# Patient Record
Sex: MALE | Race: BLACK OR AFRICAN AMERICAN | NOT HISPANIC OR LATINO | Employment: UNEMPLOYED | URBAN - METROPOLITAN AREA
[De-identification: names, ages, dates, MRNs, and addresses within clinical notes are randomized per-mention and may not be internally consistent; named-entity substitution may affect disease eponyms.]

---

## 2023-08-22 ENCOUNTER — OFFICE VISIT (OUTPATIENT)
Dept: BEHAVIORAL HEALTH | Facility: CLINIC | Age: 28
End: 2023-08-22

## 2023-08-22 ENCOUNTER — TELEPHONE (OUTPATIENT)
Dept: BEHAVIORAL HEALTH | Facility: CLINIC | Age: 28
End: 2023-08-22

## 2023-08-22 VITALS — SYSTOLIC BLOOD PRESSURE: 119 MMHG | HEART RATE: 67 BPM | DIASTOLIC BLOOD PRESSURE: 78 MMHG

## 2023-08-22 DIAGNOSIS — F11.93 OPIOID WITHDRAWAL (H): ICD-10-CM

## 2023-08-22 DIAGNOSIS — F11.90 OPIOID USE DISORDER: Primary | ICD-10-CM

## 2023-08-22 DIAGNOSIS — F14.90 COCAINE USE: ICD-10-CM

## 2023-08-22 LAB
AMPHETAMINE QUAL URINE POCT: NEGATIVE
BARBITURATE QUAL URINE POCT: NEGATIVE
BENZODIAZEPINE QUAL URINE POCT: NEGATIVE
BUPRENORPHINE QUAL URINE POCT: NEGATIVE
COCAINE QUAL URINE POCT: ABNORMAL
CREATININE QUAL URINE POCT: ABNORMAL
FENTANYL UR QL: ABNORMAL
INTERNAL QC QUAL URINE POCT: ABNORMAL
MDMA QUAL URINE POCT: NEGATIVE
METHADONE QUAL URINE POCT: NEGATIVE
METHAMPHETAMINE QUAL URINE POCT: ABNORMAL
OPIATE QUAL URINE POCT: NEGATIVE
OXYCODONE QUAL URINE POCT: NEGATIVE
PH QUAL URINE POCT: ABNORMAL
PHENCYCLIDINE QUAL URINE POCT: NEGATIVE
POCT KIT EXPIRATION DATE: ABNORMAL
POCT KIT LOT NUMBER: ABNORMAL
SPECIFIC GRAVITY POCT: 1.02
TEMPERATURE URINE POCT: ABNORMAL
THC QUAL URINE POCT: NEGATIVE

## 2023-08-22 PROCEDURE — 99204 OFFICE O/P NEW MOD 45 MIN: CPT | Performed by: NURSE PRACTITIONER

## 2023-08-22 PROCEDURE — 99000 SPECIMEN HANDLING OFFICE-LAB: CPT | Performed by: NURSE PRACTITIONER

## 2023-08-22 PROCEDURE — G0480 DRUG TEST DEF 1-7 CLASSES: HCPCS | Performed by: NURSE PRACTITIONER

## 2023-08-22 RX ORDER — BUPRENORPHINE AND NALOXONE 8; 2 MG/1; MG/1
1 FILM, SOLUBLE BUCCAL; SUBLINGUAL 2 TIMES DAILY
Qty: 14 FILM | Refills: 0 | Status: SHIPPED | OUTPATIENT
Start: 2023-08-22 | End: 2023-08-29

## 2023-08-22 RX ORDER — ONDANSETRON 4 MG/1
4 TABLET, ORALLY DISINTEGRATING ORAL EVERY 8 HOURS PRN
Qty: 12 TABLET | Refills: 0 | Status: SHIPPED | OUTPATIENT
Start: 2023-08-22

## 2023-08-22 RX ORDER — GABAPENTIN 300 MG/1
300 CAPSULE ORAL 3 TIMES DAILY PRN
Qty: 21 CAPSULE | Refills: 0 | Status: SHIPPED | OUTPATIENT
Start: 2023-08-22 | End: 2023-08-29

## 2023-08-22 RX ORDER — CLONIDINE HYDROCHLORIDE 0.1 MG/1
0.1 TABLET ORAL 3 TIMES DAILY PRN
Qty: 21 TABLET | Refills: 0 | Status: SHIPPED | OUTPATIENT
Start: 2023-08-22 | End: 2023-08-29

## 2023-08-22 ASSESSMENT — PATIENT HEALTH QUESTIONNAIRE - PHQ9: SUM OF ALL RESPONSES TO PHQ QUESTIONS 1-9: 1

## 2023-08-22 NOTE — PATIENT INSTRUCTIONS
"When it has been AT LEAST 24 hours from your last use of other opioids:     Day 1: Take 2 mg of Suboxone SL film under your tongue, then wait 30 - 45 minutes.    If withdrawal symptoms are not worse, and cravings persist or symptoms are not relieved continue to take 2- 4 mg every 2 hours. Take up to 16 mg on Day 1 .      If withdrawal symptoms do increase after this \"test dose,\" (precipitated withdrawal) don't take any more buprenorphine at that time, and use other medications for withdrawal symptoms.  Try start buprenorphine again the next day.      Day 2: take one 8-2 mg film every morning and another 8- 2 mg film every evening. Continue this until your next appointment.        "

## 2023-08-22 NOTE — PROGRESS NOTES
[facility] Initial Visit    ASSESSMENT/PLAN                                                      1. Opioid use disorder  - pt reporting 1 yr h/o daily fentanyl pill use via inhalation, last use 8/21/23 at 11 PM. Reviewed criteria met for OUD and MOUD options. Plan to start Suboxone at this time. Reviewed induction instructions including importance of waiting at least 24 hrs from last use prior to starting the medication.  All questions answered.   - Declines updated ID screening today   - Same day Comprehensive Assessment in  on 8/22/23. Pt is motivated to start inpatient programming.   - Drugs of Abuse Screen Urine (POC CUPS) POCT; Standing  - Fentanyl Qualitative with Reflex to Quant Urine; Future  - Drugs of Abuse Screen Urine (POC CUPS) POCT  - Fentanyl Qualitative with Reflex to Quant Urine  - naloxone (NARCAN) 4 MG/0.1ML nasal spray; Spray 1 spray (4 mg) into one nostril alternating nostrils as needed for opioid reversal every 2-3 minutes until assistance arrives  Dispense: 0.2 mL; Refill: 11  - buprenorphine HCl-naloxone HCl (SUBOXONE) 8-2 MG per film; Place 1 Film under the tongue 2 times daily for 7 days After following home start instructions  Dispense: 14 Film; Refill: 0    2. Opioid withdrawal (H)  - Reviewed use of below medications to treat withdrawal symptoms while starting suboxone. Declines medication for sleep.   - cloNIDine (CATAPRES) 0.1 MG tablet; Take 1 tablet (0.1 mg) by mouth 3 times daily as needed (opioid withdrawal, anxiety, restlessness, irritability, hot/cold flashes)  Dispense: 21 tablet; Refill: 0  - gabapentin (NEURONTIN) 300 MG capsule; Take 1 capsule (300 mg) by mouth 3 times daily as needed for other (withdrawal symptoms including anxiety, restlessness, sleeplessness)  Dispense: 21 capsule; Refill: 0  - ondansetron (ZOFRAN ODT) 4 MG ODT tab; Take 1 tablet (4 mg) by mouth every 8 hours as needed for nausea or vomiting  Dispense: 12 tablet; Refill: 0    3.  "Cocaine use  - reports h/o cocaine use, UDS positive for methamphetamine and cocaine today. Monitor. Address potential underlying FLORES at follow up and treatment options. Plan to completed CD eval today and proceed with inpatient programming.        Return in about 1 week (around 8/29/2023) for Follow up, with any available provider, in person.    Patient counseling completed today:  Discussed mechanism of action, potential risks/benefits/side effects of medications and other recommendations above.    Discussed risk of precipitated withdrawal with initiation of buprenorphine in the presence of full opioid agonists.    Reviewed directions for initiation of buprenorphine to reduce risk of precipitated withdrawal and maximize efficacy.    Harm reduction counseling including never use alone, availability of naloxone, avoiding combination of opioids with benzodiazepines, alcohol, or other sedatives, safer administration.      Discussed importance of avoiding isolation, building a network of supportive relationships, avoiding people/places/things associated with past use to reduce risk of relapse; including motivational interviewing regarding psychosocial treatment for addiction.     SUBJECTIVE                                                      CC/HPI:  Aron Ro is a 28 year old male with PMH of opioid use disorder who presents to the [facility] for initial visit.      Brief History:  Aron Ro was first seen in Recovery Clinic on 08/22/23. They were referred by Family practice at [facility]. Patient's reasons for seeking treatment on this date include \"I have been using opioids for a long time and want to stop\".     He is here today with his sister. Reports opioid use starting about 1 yr ago. Reports he has been using Perc 30 pills daily. Using 4-5 per day, trying to reduce use to 2-3 pills per day. Last use was 8/21/23 at 11 PM, denies current withdrawal symptoms. He was previously started on Suboxone at " "[facility] detox, that was May 2023. No MOUD since detox. He is interest in treatment.     Substance Use History :  Opioids:   Age at first use: 20\"s   Current use: substance: perc 30; quantity 4-5 a day; route: inhalation ; timing of last use: 8/21/23     IV drug use: No   History of overdose: No  Previous residential or outpatient treatments for addiction : Yes: 2 times, 5/2023- [facility]  Previous medication treatments for addiction: No  Longest period of sobriety: 3 days  Medical complications related to substance use: NA   Hepatitis C: NA; Date of most recent testing: Unknown, pt declines screening on 8/22/23  HIV: NA; Date of most recent testing: unknown, pt declines screening on 8/22/23    Taking buprenorphine? No   Narcan currently available: No    DSM-5 OUD criteria met:  Taken in larger amounts/greater time spent in behavior over longer period of time than intended,Yes   Persistent desire or unsuccessful efforts to cut down or control use/behavior, Yes:   A great deal of time is spent in activities necessary to obtain the substance/participate in the behavior or recover from its effects, Yes:   Cravings, Yes   Recurrent use/behavior resulting in failure to fulfill major role obligations at work, school, or home, Yes   Continued use/behavior despite having persistent or recurrent social or interpersonal problems caused or exacerbated by effects of use/behavior, Yes   Important social, occupational, or recreational activities are given up or reduced because of use/behavior, No   Recurrent use/behavior in situations in which it is physically hazardous, Yes   Continued use/behavior despite knowledge of having a persistent or recurrent physical or psychological problem that is likely to have been caused or exacerbated by use/behavior, Yes   Tolerance, Yes    Withdrawal, No     Other Addiction History:  Stimulants (cocaine, methamphetamine, MDMA/ecstasy)   cocaine  Sedatives/hypnotics/anxiolytics: (benzodiazepines, " GHB, Ambien, phenobarbital)  none  Alcohol:   none  Nicotine: (cigarettes, vaping, chew/snuff)  none  Cannabis:   none  Hallucinogens/Dissociatives: (acid, mushrooms, ketamine)  none  Eating disorder:  none  Gambling:   none        [place] Prescription Drug Monitoring Program Reviewed:  Yes    PAST PSYCHIATRIC HISTORY:  Diagnoses- none  Suicide Attempts: No   Hospitalizations: No         8/22/2023     1:00 PM   PHQ   PHQ-9 Total Score 1   Q9: Thoughts of better off dead/self-harm past 2 weeks Not at all     If PHQ-9 score of 15 or higher, has [facility] therapist or provider been notified? No    Any current suicidal ideation? No  If yes, has [facility] therapist or provider been notified? N/A    Mental health provider: none (follow up on MH referral if needed)      Social History  Housing status: with parents  Employment status: Employed part time  Relationship status: Single  Children: no children  Legal: none  Insurance needs: active   Contact information up to date? yes    [employer]olvement Sister Eveline (please obtain JOSE ANTONIO if pt would like to include)      Medications:  No current outpatient medications on file prior to visit.  No current facility-administered medications on file prior to visit.      No Known Allergies    No past medical history on file.    No past surgical history on file.    No family history on file.      REVIEW OF SYSTEMS:  General: No recent fevers.   Eyes:  No vision concerns.  No jaundice.    Resp: No coughing, wheezing or shortness of breath  CV: No chest pains or palpitations  GI: No complaints other than as above  Musculoskeletal: No significant muscle or joint pains .  No edema  Neurologic: No numbness, tingling, weakness, problems with balance or coordination  Psychiatric: No acute concerns other than as above.   Skin: No rashes or areas of acute infection    OBJECTIVE                                                        Clinical Opioid Withdrawal Scale  (COWS)    Resting Pulse Rate  0  =  <=80    Sweating    (over past 1/2 hour) 0  =  no report of chills or flushing   Restlessness  0  =  able to sit still   Pupil size  0  =  pupils pinned or normal size for room light   Bone or Joint Aches    (acute only) 0  =  not present   Runny nose or tearing    (unrelated to cold/allergies) 0  =  not present   GI Upset    (over past 1/2 hour) 0  =  no GI symptoms   Tremor    (outstretched hands) 0  =  no tremor   Yawning    (during assessment) 0  =  no yawning   Anxiety/Irritability 2  =  patient obviously irritable or anxious   Gooseflesh skin 0  =  skin is smooth     TOTAL SCORE  Add column for score   2       /78   Pulse 67     Physical Exam  Constitutional:       General: He is not in acute distress.     Appearance: Normal appearance.   Eyes:      Extraocular Movements: Extraocular movements intact.   Pulmonary:      Effort: Pulmonary effort is normal.   Neurological:      General: No focal deficit present.      Mental Status: He is alert and oriented to person, place, and time.      Coordination: Coordination normal.      Gait: Gait normal.   Psychiatric:         Attention and Perception: Attention and perception normal.         Mood and Affect: Mood is anxious.         Speech: Speech normal.         Behavior: Behavior is cooperative.         Thought Content: Thought content normal.      Comments: Insight and judgment fair          Labs:    UDS:   Lab Results   Component Value Date    BUP Negative 08/22/2023    BZO Negative 08/22/2023    BAR Negative 08/22/2023    JIMI Screen Positive (A) 08/22/2023    MAMP Screen Positive (A) 08/22/2023    AMP Negative 08/22/2023    MDMA Negative 08/22/2023    MTD Negative 08/22/2023    [id number] Negative 08/22/2023    OXY Negative 08/22/2023    PCP Negative 08/22/2023    THC Negative 08/22/2023    TEMP 94 F 08/22/2023    SGPOCT 1.025 08/22/2023       *POC urine drug screen does not screen for Fentanyl    Recent Results (from the past  720 hour(s))   Drugs of Abuse Screen Urine (POC CUPS) POCT    Collection Time: 08/22/23  1:38 PM   Result Value Ref Range    POCT Kit Lot Number [id number]     POCT Kit Expiration Date 85435043     Temperature Urine POCT 94 F 90 F, 92 F, 94 F, 96 F, 98 F, 100 F    Specific [place] POCT 1.025 1.005, 1.015, 1.025    pH Qual Urine POCT 5 pH 4 pH, 5 pH, 7 pH, 9 pH    Creatinine Qual Urine POCT 50 mg/dL 20 mg/dL, 50 mg/dL, 100 mg/dL, 200 mg/dL    Internal QC Qual Urine POCT Valid Valid    Amphetamine Qual Urine POCT Negative Negative    Barbiturate Qual Urine POCT Negative Negative    Buprenorphine Qual Urine POCT Negative Negative    Benzodiazepine Qual Urine POCT Negative Negative    Cocaine Qual Urine POCT Screen Positive (A) Negative    Methamphetamine Qual Urine POCT Screen Positive (A) Negative    MDMA Qual Urine POCT Negative Negative    Methadone Qual Urine POCT Negative Negative    Opiate Qual Urine POCT Negative Negative    Oxycodone Qual Urine POCT Negative Negative    Phencyclidine Qual Urine POCT Negative Negative    THC Qual Urine POCT Negative Negative         [facility]  [address], [address]  [place] [address] [phone]

## 2023-08-22 NOTE — TELEPHONE ENCOUNTER
The patient came in to schedule CD evaluation and was in a rush as he stated that he was ready to leave-he was offered to schedule face to face evaluation with [facility] or on telephone with [facility] and the patient choose [facility] and when we were completing the referral we were not able to find the patients medical insurance information so the patient stated that he has a copy of his medical insurance card at home and he stated he would call this writer when he got home and found the card.

## 2023-08-28 LAB
FENTANYL UR-MCNC: 423 NG/ML
NORFENTANYL UR-MCNC: >1000 NG/ML